# Patient Record
Sex: MALE | Race: WHITE | URBAN - METROPOLITAN AREA
[De-identification: names, ages, dates, MRNs, and addresses within clinical notes are randomized per-mention and may not be internally consistent; named-entity substitution may affect disease eponyms.]

---

## 2017-01-03 ENCOUNTER — OFFICE VISIT (OUTPATIENT)
Dept: CARDIOLOGY CLINIC | Age: 47
End: 2017-01-03

## 2017-01-03 VITALS
DIASTOLIC BLOOD PRESSURE: 72 MMHG | SYSTOLIC BLOOD PRESSURE: 112 MMHG | HEART RATE: 60 BPM | BODY MASS INDEX: 26.81 KG/M2 | HEIGHT: 69 IN | WEIGHT: 181 LBS

## 2017-01-03 DIAGNOSIS — R06.02 SOB (SHORTNESS OF BREATH) ON EXERTION: Primary | ICD-10-CM

## 2017-01-03 PROBLEM — R42 DIZZINESS: Status: ACTIVE | Noted: 2017-01-03

## 2017-01-03 NOTE — LETTER
1/3/2017 12:13 PM 
 
RE:    Meredith Brothers  
[address] [facility] [address] [address] Thank you for agreeing to see Usama Briscoe I am referring my patient to you for evaluation of ***. Please see his 
pertinent patient information below. {[facility] [id number]} I appreciate your assistance in Mr. Amado Cedillo care  and look forward to your findings and recommendations.  
 
 
 
Sincerely, 
 
 
Marlin Matos MD

## 2017-01-03 NOTE — MR AVS SNAPSHOT
Visit Information Date & Time Provider Department Dept. Phone Encounter #  
 1/3/2017 11:00 AM Amy Rivera MD [facility] Roger Ardon [phone] [id number] Upcoming Health Maintenance Date Due DTaP/Tdap/Td series (1 - Tdap) 2/16/1992 INFLUENZA AGE 9 TO ADULT 8/1/2016 Allergies as of 1/3/2017  Review Complete On: 1/3/2017 By: Vini Gold LPN No Known Allergies Current Immunizations  Never Reviewed No immunizations on file. Not reviewed this visit Vitals BP Pulse Height(growth percentile) Weight(growth percentile) BMI Smoking Status 112/72 (BP 1 Location: Left arm, BP Patient Position: Sitting) 60 5' 9\" (1.753 m) 181 lb (82.1 kg) 26.73 kg/m2 Never Smoker Vitals History BMI and BSA Data Body Mass Index Body Surface Area  
 26.73 kg/m 2 2 m 2 Your Updated Medication List  
  
Notice  As of 1/3/2017 12:06 PM  
 You have not been prescribed any medications. Patient Instructions Stress Echo and see Emory Walton a week after test 
 
  
Introducing [facility] & HEALTH SERVICES! [facility] introduces Intelli[employer] patient portal. Now you can access parts of your medical record, email your doctor's office, and request medication refills online. 1. In your internet browser, go to [email or web address] 2. Click on the First Time User? Click Here link in the Sign In box. You will see the New Member Sign Up page. 3. Enter your Intelli[employer] Access Code exactly as it appears below. You will not need to use this code after youve completed the sign-up process. If you do not sign up before the expiration date, you must request a new code. · [employer]elli[employer] Access Code: [email or web address] Expires: 3/30/2017  3:24 PM 
 
4. Enter the last four digits of your Social Security Number (xxxx) and Date of Birth (mm/dd/yyyy) as indicated and click Submit. You will be taken to the next sign-up page. 5. Create a Vortal ID. This will be your Vortal login ID and cannot be changed, so think of one that is secure and easy to remember. 6. Create a Vortal password. You can change your password at any time. 7. Enter your Password Reset Question and Answer. This can be used at a later time if you forget your password. 8. Enter your e-mail address. You will receive e-mail notification when new information is available in [address] 9. Click Sign Up. You can now view and download portions of your medical record. 10. Click the Download Summary menu link to download a portable copy of your medical information. If you have questions, please visit the Frequently Asked Questions section of the Vortal website. Remember, Vortal is NOT to be used for urgent needs. For medical emergencies, dial 911. Now available from your iPhone and Android! Please provide this summary of care documentation to your next provider. If you have any questions after today's visit, please call [phone].

## 2017-01-03 NOTE — PROGRESS NOTES
HISTORY OF PRESENT ILLNESS  Jyoti Mclean is a 40 y.o. male. Patient with no significant PMH except recent recurrent UTIs , here for evaluation of sob with activities and dizziness  PMH: as above  PSH: gaby's tendon repair  SH: no tobacco , occasional etoh, very active training with seal team,   FH: Father with cabg and valve replacement in his early 76s    HPI  In the last 2 months he has continued to exercise with seal team but noticed more sob with less activities also dizziness when standing and with exercise more noticeable than before  Review of Systems   Respiratory: Positive for shortness of breath. Cardiovascular: Negative. Neurological: Positive for dizziness. Visit Vitals    /72 (BP 1 Location: Left arm, BP Patient Position: Sitting)    Pulse 60    Ht 5' 9\" (1.753 m)    Wt 82.1 kg (181 lb)    BMI 26.73 kg/m2       Physical Exam   Neck: No JVD present. Carotid bruit is not present. Cardiovascular: Normal rate and regular rhythm. Murmur heard. Systolic murmur is present with a grade of 1/6   Pulmonary/Chest: Effort normal and breath sounds normal.   Abdominal: Soft. Musculoskeletal: He exhibits no edema. Psychiatric: He has a normal mood and affect. No current outpatient prescriptions on file prior to visit. No current facility-administered medications on file prior to visit. ASSESSMENT and PLAN  SOB: difficult to ascertain at this time , his [facility] is still pending.  His BP is well controlled  He does have some risjk factors for cad  I feel that we should proceed with stress echo to r/o ischemia, evaluate valves for murmur and evaluate EF also it will be helpful to determine BP when exercising and soon after in case of sudden drop in BP  Dizziness: possibly related to low BP but needs better evaluation, proceed with stress echo as above  HTN: none at this time  See him back after test

## 2017-01-09 ENCOUNTER — TELEPHONE (OUTPATIENT)
Dept: CARDIOLOGY CLINIC | Age: 47
End: 2017-01-09

## 2017-01-09 NOTE — TELEPHONE ENCOUNTER
Idella Canavan  Male, 40 y.o., 1970  Weight:   181 lb (82.1 kg)  Phone:   [phone]  PCP:   None  MRN:   [id number]  MyChart:   Pending  Next Appt:   01/10/2017     Message  Received: Today       [address], LPN                     Message ref to  Pt> Rafael Fleming     Patients Stress echo has been denied for Tomorrow Devonte.10, 2017   NO risk factors just  family hx of CAD. Please cancel test.     Brendan Kim              Discussed with Calleen Silence and he said just do a regular ETT and Echo for murmur. Per Barry Kim no auth. required for Echo. Tata Vargas was notified about the changes.

## 2017-01-10 ENCOUNTER — CLINICAL SUPPORT (OUTPATIENT)
Dept: CARDIOLOGY CLINIC | Age: 47
End: 2017-01-10

## 2017-01-10 DIAGNOSIS — R06.02 SOB (SHORTNESS OF BREATH) ON EXERTION: Primary | ICD-10-CM

## 2017-01-10 DIAGNOSIS — R42 DIZZINESS: ICD-10-CM

## 2017-01-10 DIAGNOSIS — I36.1 TRICUSPID VALVE INCOMPETENCE, NON-RHEUMATIC: ICD-10-CM

## 2017-01-20 ENCOUNTER — OFFICE VISIT (OUTPATIENT)
Dept: CARDIOLOGY CLINIC | Age: 47
End: 2017-01-20

## 2017-01-20 VITALS
HEART RATE: 64 BPM | HEIGHT: 69 IN | RESPIRATION RATE: 16 BRPM | OXYGEN SATURATION: 98 % | WEIGHT: 181 LBS | SYSTOLIC BLOOD PRESSURE: 130 MMHG | BODY MASS INDEX: 26.81 KG/M2 | DIASTOLIC BLOOD PRESSURE: 60 MMHG

## 2017-01-20 DIAGNOSIS — R06.02 SOB (SHORTNESS OF BREATH) ON EXERTION: Primary | ICD-10-CM

## 2017-01-20 NOTE — PROGRESS NOTES
HISTORY OF PRESENT ILLNESS  Yocasta Rico is a 40 y.o. male. Patient with no significant PMH except recent recurrent UTIs , here for evaluation of sob with activities and dizziness  Echo on 1/17:Systolic function was normal. Ejection fraction was  estimated to be 57 % by Cohen's biplane technique. There were no  regional wall motion abnormalities. Tricuspid valve: Pulmonary artery systolic pressure: 25 mmHg. ETT on 1/17 no ischemia  PMH: as above  PSH: gaby's tendon repair  SH: no tobacco , occasional etoh, very active training with seal team,   FH: Father with cabg and valve replacement in his early 76s  HPI  No complaints   Much better symptoms have resolved  Review of Systems   Respiratory: Negative. Cardiovascular: Negative.         Physical Exam  Visit Vitals    /60 (BP 1 Location: Left arm, BP Patient Position: Sitting)    Pulse 64    Resp 16    Ht 5' 9\" (1.753 m)    Wt 82.1 kg (181 lb)    SpO2 98%    BMI 26.73 kg/m2       ASSESSMENT and PLAN  Discussed results of echo and ett in details   Given lack of recurrent symptoms and essentially normal cardiac w/u no additional intervention unless of recurrent symptoms  I will see her on prn base

## 2017-01-20 NOTE — MR AVS SNAPSHOT
Visit Information Date & Time Provider Department Dept. Phone Encounter #  
 1/20/2017  3:40 PM Irving Pendleton MD [facility] Harjinder Saucedo [phone] [id number] Your Appointments 1/10/2017  1:00 PM  
STRESS ECHOCARDIOGRAMS with [facility] ([facility] SCHEDULING) Appt Note: stress echo for sob per dr Dai Blackmon [address]  
[address] [facility] [address]  
  
    
 1/10/2017  1:00 PM  
ECHO CARDIOGRAMS 2D with Pa Boggs [facility] ([facility] SCHEDULING) Appt Note: stress echo for sob per dr Dai Blackmon [address]  
[address] [facility]  
  
    
 1/20/2017  3:40 PM  
ESTABLISHED PATIENT with Irving Pendleton MD  
[facility] ([address]) Appt Note: 1 wk f/u from echo  
 [address] [facility] [address]  
[address] [facility] [address] [address] Upcoming Health Maintenance Date Due DTaP/Tdap/Td series (1 - Tdap) 2/16/1992 INFLUENZA AGE 9 TO ADULT 8/1/2016 Allergies as of 1/20/2017  Review Complete On: 1/3/2017 By: Irving Pendleton MD  
 No Known Allergies Current Immunizations  Never Reviewed No immunizations on file. Not reviewed this visit Vitals Smoking Status Never Smoker Your Updated Medication List  
  
Notice  As of 1/5/2017  8:37 AM  
 You have not been prescribed any medications. Introducing [facility]! Digna Banegas introduces [employer] patient portal. Now you can access parts of your medical record, email your doctor's office, and request medication refills online. 1. In your internet browser, go to [email or web address] 2. Click on the First Time User? Click Here link in the Sign In box. You will see the New Member Sign Up page. 3. Enter your [email or web address] Access Code exactly as it appears below. You will not need to use this code after youve completed the sign-up process. If you do not sign up before the expiration date, you must request a new code. · [email or web address] Access Code: [email or web address] Expires: 3/30/2017  3:24 PM 
 
4. Enter the last four digits of your Social Security Number (xxxx) and Date of Birth (mm/dd/yyyy) as indicated and click Submit. You will be taken to the next sign-up page. 5. Create a [email or web address] ID. This will be your [email or web address] login ID and cannot be changed, so think of one that is secure and easy to remember. 6. Create a [email or web address] password. You can change your password at any time. 7. Enter your Password Reset Question and Answer. This can be used at a later time if you forget your password. 8. Enter your e-mail address. You will receive e-mail notification when new information is available in [address] 9. Click Sign Up. You can now view and download portions of your medical record. 10. Click the Download Summary menu link to download a portable copy of your medical information. If you have questions, please visit the Frequently Asked Questions section of the [email or web address] website. Remember, [email or web address] is NOT to be used for urgent needs. For medical emergencies, dial 911. Now available from your iPhone and Android! Please provide this summary of care documentation to your next provider. If you have any questions after today's visit, please call [phone].